# Patient Record
Sex: MALE
[De-identification: names, ages, dates, MRNs, and addresses within clinical notes are randomized per-mention and may not be internally consistent; named-entity substitution may affect disease eponyms.]

---

## 2024-08-15 ENCOUNTER — RX ONLY (OUTPATIENT)
Age: 44
Setting detail: RX ONLY
End: 2024-08-15

## 2024-08-15 ENCOUNTER — APPOINTMENT (RX ONLY)
Dept: URBAN - METROPOLITAN AREA CLINIC 41 | Facility: CLINIC | Age: 44
Setting detail: DERMATOLOGY
End: 2024-08-15

## 2024-08-15 DIAGNOSIS — L71.8 OTHER ROSACEA: ICD-10-CM | Status: INADEQUATELY CONTROLLED

## 2024-08-15 PROCEDURE — ? PRESCRIPTION MEDICATION MANAGEMENT

## 2024-08-15 PROCEDURE — ? MEDICATION COUNSELING

## 2024-08-15 PROCEDURE — ? COUNSELING

## 2024-08-15 PROCEDURE — 99213 OFFICE O/P EST LOW 20 MIN: CPT

## 2024-08-15 PROCEDURE — ? MDM - TREATMENT GOALS

## 2024-08-15 RX ORDER — DOXYCYCLINE 100 MG/1
CAPSULE ORAL
Qty: 90 | Refills: 0 | Status: ERX

## 2024-08-15 ASSESSMENT — LOCATION DETAILED DESCRIPTION DERM
LOCATION DETAILED: LEFT MEDIAL MALAR CHEEK
LOCATION DETAILED: RIGHT INFERIOR CENTRAL MALAR CHEEK
LOCATION DETAILED: LEFT CENTRAL MALAR CHEEK

## 2024-08-15 ASSESSMENT — LOCATION SIMPLE DESCRIPTION DERM
LOCATION SIMPLE: LEFT CHEEK
LOCATION SIMPLE: RIGHT CHEEK

## 2024-08-15 ASSESSMENT — LOCATION ZONE DERM: LOCATION ZONE: FACE

## 2024-08-15 NOTE — PROCEDURE: PRESCRIPTION MEDICATION MANAGEMENT
Render In Strict Bullet Format?: No
Discontinue Regimen: Doxycycline 100 mg BID x 1 month —> Doxy 100mg QD x 3 months
Detail Level: Zone

## 2024-08-15 NOTE — PROCEDURE: MEDICATION COUNSELING
Ilumya Pregnancy And Lactation Text: The risk during pregnancy and breastfeeding is uncertain with this medication.
Erythromycin Pregnancy And Lactation Text: This medication is Pregnancy Category B and is considered safe during pregnancy. It is also excreted in breast milk.
Dupixent Counseling: I discussed with the patient the risks of dupilumab including but not limited to eye infection and irritation, cold sores, injection site reactions, worsening of asthma, allergic reactions and increased risk of parasitic infection.  Live vaccines should be avoided while taking dupilumab. Dupilumab will also interact with certain medications such as warfarin and cyclosporine. The patient understands that monitoring is required and they must alert us or the primary physician if symptoms of infection or other concerning signs are noted.
Oral Minoxidil Counseling- I discussed with the patient the risks of oral minoxidil including but not limited to shortness of breath, swelling of the feet or ankles, dizziness, lightheadedness, unwanted hair growth and allergic reaction.  The patient verbalized understanding of the proper use and possible adverse effects of oral minoxidil.  All of the patient's questions and concerns were addressed.
Acitretin Pregnancy And Lactation Text: This medication is Pregnancy Category X and should not be given to women who are pregnant or may become pregnant in the future. This medication is excreted in breast milk.
Colchicine Counseling:  Patient counseled regarding adverse effects including but not limited to stomach upset (nausea, vomiting, stomach pain, or diarrhea).  Patient instructed to limit alcohol consumption while taking this medication.  Colchicine may reduce blood counts especially with prolonged use.  The patient understands that monitoring of kidney function and blood counts may be required, especially at baseline. The patient verbalized understanding of the proper use and possible adverse effects of colchicine.  All of the patient's questions and concerns were addressed.
Zoryve Counseling:  I discussed with the patient that Zoryve is not for use in the eyes, mouth or vagina. The most commonly reported side effects include diarrhea, headache, insomnia, application site pain, upper respiratory tract infections, and urinary tract infections.  All of the patient's questions and concerns were addressed.
Hydroxychloroquine Pregnancy And Lactation Text: This medication has been shown to cause fetal harm but it isn't assigned a Pregnancy Risk Category. There are small amounts excreted in breast milk.
Rifampin Counseling: I discussed with the patient the risks of rifampin including but not limited to liver damage, kidney damage, red-orange body fluids, nausea/vomiting and severe allergy.
Methotrexate Counseling:  Patient counseled regarding adverse effects of methotrexate including but not limited to nausea, vomiting, abnormalities in liver function tests. Patients may develop mouth sores, rash, diarrhea, and abnormalities in blood counts. The patient understands that monitoring is required including LFT's and blood counts.  There is a rare possibility of scarring of the liver and lung problems that can occur when taking methotrexate. Persistent nausea, loss of appetite, pale stools, dark urine, cough, and shortness of breath should be reported immediately. Patient advised to discontinue methotrexate treatment at least three months before attempting to become pregnant.  I discussed the need for folate supplements while taking methotrexate.  These supplements can decrease side effects during methotrexate treatment. The patient verbalized understanding of the proper use and possible adverse effects of methotrexate.  All of the patient's questions and concerns were addressed.
Skyrizi Counseling: I discussed with the patient the risks of risankizumab-rzaa including but not limited to immunosuppression, and serious infections.  The patient understands that monitoring is required including a PPD at baseline and must alert us or the primary physician if symptoms of infection or other concerning signs are noted.
Xolair Pregnancy And Lactation Text: This medication is Pregnancy Category B and is considered safe during pregnancy. This medication is excreted in breast milk.
Azelaic Acid Counseling: Patient counseled that medicine may cause skin irritation and to avoid applying near the eyes.  In the event of skin irritation, the patient was advised to reduce the amount of the drug applied or use it less frequently.   The patient verbalized understanding of the proper use and possible adverse effects of azelaic acid.  All of the patient's questions and concerns were addressed.
Tazorac Counseling:  Patient advised that medication is irritating and drying.  Patient may need to apply sparingly and wash off after an hour before eventually leaving it on overnight.  The patient verbalized understanding of the proper use and possible adverse effects of tazorac.  All of the patient's questions and concerns were addressed.
Topical Steroids Applications Pregnancy And Lactation Text: Most topical steroids are considered safe to use during pregnancy and lactation.  Any topical steroid applied to the breast or nipple should be washed off before breastfeeding.
Griseofulvin Pregnancy And Lactation Text: This medication is Pregnancy Category X and is known to cause serious birth defects. It is unknown if this medication is excreted in breast milk but breast feeding should be avoided.
Olumiant Pregnancy And Lactation Text: Based on animal studies, Olumiant may cause embryo-fetal harm when administered to pregnant women.  The medication should not be used in pregnancy.  Breastfeeding is not recommended during treatment.
Qbrexza Pregnancy And Lactation Text: There is no available data on Qbrexza use in pregnant women.  There is no available data on Qbrexza use in lactation.
Mirvaso Counseling: Mirvaso is a topical medication which can decrease superficial blood flow where applied. Side effects are uncommon and include stinging, redness and allergic reactions.
Dutasteride Female Counseling: Dutasteride Counseling:  I discussed with the patient the risks of use of dutasteride including but not limited to decreased libido and sexual dysfunction. Explained the teratogenic nature of the medication and stressed the importance of not getting pregnant during treatment. All of the patient's questions and concerns were addressed.
Eucrisa Pregnancy And Lactation Text: This medication has not been assigned a Pregnancy Risk Category but animal studies failed to show danger with the topical medication. It is unknown if the medication is excreted in breast milk.
Calcipotriene Pregnancy And Lactation Text: The use of this medication during pregnancy or lactation is not recommended as there is insufficient data.
Bactrim Pregnancy And Lactation Text: This medication is Pregnancy Category D and is known to cause fetal risk.  It is also excreted in breast milk.
Dupixent Pregnancy And Lactation Text: This medication likely crosses the placenta but the risk for the fetus is uncertain. This medication is excreted in breast milk.
Infliximab Counseling:  I discussed with the patient the risks of infliximab including but not limited to myelosuppression, immunosuppression, autoimmune hepatitis, demyelinating diseases, lymphoma, and serious infections.  The patient understands that monitoring is required including a PPD at baseline and must alert us or the primary physician if symptoms of infection or other concerning signs are noted.
Doxepin Counseling:  Patient advised that the medication is sedating and not to drive a car after taking this medication. Patient informed of potential adverse effects including but not limited to dry mouth, urinary retention, and blurry vision.  The patient verbalized understanding of the proper use and possible adverse effects of doxepin.  All of the patient's questions and concerns were addressed.
Oral Minoxidil Pregnancy And Lactation Text: This medication should only be used when clearly needed if you are pregnant, attempting to become pregnant or breast feeding.
Thalidomide Counseling: I discussed with the patient the risks of thalidomide including but not limited to birth defects, anxiety, weakness, chest pain, dizziness, cough and severe allergy.
Bexarotene Counseling:  I discussed with the patient the risks of bexarotene including but not limited to hair loss, dry lips/skin/eyes, liver abnormalities, hyperlipidemia, pancreatitis, depression/suicidal ideation, photosensitivity, drug rash/allergic reactions, hypothyroidism, anemia, leukopenia, infection, cataracts, and teratogenicity.  Patient understands that they will need regular blood tests to check lipid profile, liver function tests, white blood cell count, thyroid function tests and pregnancy test if applicable.
Colchicine Pregnancy And Lactation Text: This medication is Pregnancy Category C and isn't considered safe during pregnancy. It is excreted in breast milk.
Rifampin Pregnancy And Lactation Text: This medication is Pregnancy Category C and it isn't know if it is safe during pregnancy. It is also excreted in breast milk and should not be used if you are breast feeding.
Adbry Counseling: I discussed with the patient the risks of tralokinumab including but not limited to eye infection and irritation, cold sores, injection site reactions, worsening of asthma, allergic reactions and increased risk of parasitic infection.  Live vaccines should be avoided while taking tralokinumab. The patient understands that monitoring is required and they must alert us or the primary physician if symptoms of infection or other concerning signs are noted.
Azathioprine Counseling:  I discussed with the patient the risks of azathioprine including but not limited to myelosuppression, immunosuppression, hepatotoxicity, lymphoma, and infections.  The patient understands that monitoring is required including baseline LFTs, Creatinine, possible TPMP genotyping and weekly CBCs for the first month and then every 2 weeks thereafter.  The patient verbalized understanding of the proper use and possible adverse effects of azathioprine.  All of the patient's questions and concerns were addressed.
Methotrexate Pregnancy And Lactation Text: This medication is Pregnancy Category X and is known to cause fetal harm. This medication is excreted in breast milk.
Low Dose Naltrexone Counseling- I discussed with the patient the potential risks and side effects of low dose naltrexone including but not limited to: more vivid dreams, headaches, nausea, vomiting, abdominal pain, fatigue, dizziness, and anxiety.
Zoryve Pregnancy And Lactation Text: It is unknown if this medication can cause problems during pregnancy and breastfeeding.
Topical Sulfur Applications Counseling: Topical Sulfur Counseling: Patient counseled that this medication may cause skin irritation or allergic reactions.  In the event of skin irritation, the patient was advised to reduce the amount of the drug applied or use it less frequently.   The patient verbalized understanding of the proper use and possible adverse effects of topical sulfur application.  All of the patient's questions and concerns were addressed.
Itraconazole Counseling:  I discussed with the patient the risks of itraconazole including but not limited to liver damage, nausea/vomiting, neuropathy, and severe allergy.  The patient understands that this medication is best absorbed when taken with acidic beverages such as non-diet cola or ginger ale.  The patient understands that monitoring is required including baseline LFTs and repeat LFTs at intervals.  The patient understands that they are to contact us or the primary physician if concerning signs are noted.
Rhofade Counseling: Rhofade is a topical medication which can decrease superficial blood flow where applied. Side effects are uncommon and include stinging, redness and allergic reactions.
Tazorac Pregnancy And Lactation Text: This medication is not safe during pregnancy. It is unknown if this medication is excreted in breast milk.
Dutasteride Pregnancy And Lactation Text: This medication is absolutely contraindicated in women, especially during pregnancy and breast feeding. Feminization of male fetuses is possible if taking while pregnant.
Mirvaso Pregnancy And Lactation Text: This medication has not been assigned a Pregnancy Risk Category. It is unknown if the medication is excreted in breast milk.
Azelaic Acid Pregnancy And Lactation Text: This medication is considered safe during pregnancy and breast feeding.
Hydroquinone Counseling:  Patient advised that medication may result in skin irritation, lightening (hypopigmentation), dryness, and burning.  In the event of skin irritation, the patient was advised to reduce the amount of the drug applied or use it less frequently.  Rarely, spots that are treated with hydroquinone can become darker (pseudoochronosis).  Should this occur, patient instructed to stop medication and call the office. The patient verbalized understanding of the proper use and possible adverse effects of hydroquinone.  All of the patient's questions and concerns were addressed.
Rinvoq Counseling: I discussed with the patient the risks of Rinvoq therapy including but not limited to upper respiratory tract infections, shingles, cold sores, bronchitis, nausea, cough, fever, acne, and headache. Live vaccines should be avoided.  This medication has been linked to serious infections; higher rate of mortality; malignancy and lymphoproliferative disorders; major adverse cardiovascular events; thrombosis; thrombocytopenia, anemia, and neutropenia; lipid elevations; liver enzyme elevations; and gastrointestinal perforations.
Enbrel Counseling:  I discussed with the patient the risks of etanercept including but not limited to myelosuppression, immunosuppression, autoimmune hepatitis, demyelinating diseases, lymphoma, and infections.  The patient understands that monitoring is required including a PPD at baseline and must alert us or the primary physician if symptoms of infection or other concerning signs are noted.
Cephalexin Counseling: I counseled the patient regarding use of cephalexin as an antibiotic for prophylactic and/or therapeutic purposes. Cephalexin (commonly prescribed under brand name Keflex) is a cephalosporin antibiotic which is active against numerous classes of bacteria, including most skin bacteria. Side effects may include nausea, diarrhea, gastrointestinal upset, rash, hives, yeast infections, and in rare cases, hepatitis, kidney disease, seizures, fever, confusion, neurologic symptoms, and others. Patients with severe allergies to penicillin medications are cautioned that there is about a 10% incidence of cross-reactivity with cephalosporins. When possible, patients with penicillin allergies should use alternatives to cephalosporins for antibiotic therapy.
Cantharidin Counseling:  I discussed with the patient the risks of Cantharidin including but not limited to pain, redness, burning, itching, and blistering.
Adbry Pregnancy And Lactation Text: It is unknown if this medication will adversely affect pregnancy or breast feeding.
Thalidomide Pregnancy And Lactation Text: This medication is Pregnancy Category X and is absolutely contraindicated during pregnancy. It is unknown if it is excreted in breast milk.
Erivedge Counseling- I discussed with the patient the risks of Erivedge including but not limited to nausea, vomiting, diarrhea, constipation, weight loss, changes in the sense of taste, decreased appetite, muscle spasms, and hair loss.  The patient verbalized understanding of the proper use and possible adverse effects of Erivedge.  All of the patient's questions and concerns were addressed.
Bexarotene Pregnancy And Lactation Text: This medication is Pregnancy Category X and should not be given to women who are pregnant or may become pregnant. This medication should not be used if you are breast feeding.
Doxepin Pregnancy And Lactation Text: This medication is Pregnancy Category C and it isn't known if it is safe during pregnancy. It is also excreted in breast milk and breast feeding isn't recommended.
Sarecycline Counseling: Patient advised regarding possible photosensitivity and discoloration of the teeth, skin, lips, tongue and gums.  Patient instructed to avoid sunlight, if possible.  When exposed to sunlight, patients should wear protective clothing, sunglasses, and sunscreen.  The patient was instructed to call the office immediately if the following severe adverse effects occur:  hearing changes, easy bruising/bleeding, severe headache, or vision changes.  The patient verbalized understanding of the proper use and possible adverse effects of sarecycline.  All of the patient's questions and concerns were addressed.
Low Dose Naltrexone Pregnancy And Lactation Text: Naltrexone is pregnancy category C.  There have been no adequate and well-controlled studies in pregnant women.  It should be used in pregnancy only if the potential benefit justifies the potential risk to the fetus.   Limited data indicates that naltrexone is minimally excreted into breastmilk.
Otezla Counseling: The side effects of Otezla were discussed with the patient, including but not limited to worsening or new depression, weight loss, diarrhea, nausea, upper respiratory tract infection, and headache. Patient instructed to call the office should any adverse effect occur.  The patient verbalized understanding of the proper use and possible adverse effects of Otezla.  All the patient's questions and concerns were addressed.
Prednisone Counseling:  I discussed with the patient the risks of prolonged use of prednisone including but not limited to weight gain, insomnia, osteoporosis, mood changes, diabetes, susceptibility to infection, glaucoma and high blood pressure.  In cases where prednisone use is prolonged, patients should be monitored with blood pressure checks, serum glucose levels and an eye exam.  Additionally, the patient may need to be placed on GI prophylaxis, PCP prophylaxis, and calcium and vitamin D supplementation and/or a bisphosphonate.  The patient verbalized understanding of the proper use and the possible adverse effects of prednisone.  All of the patient's questions and concerns were addressed.
Infliximab Pregnancy And Lactation Text: This medication is Pregnancy Category B and is considered safe during pregnancy. It is unknown if this medication is excreted in breast milk.
Detail Level: Simple
Dapsone Counseling: I discussed with the patient the risks of dapsone including but not limited to hemolytic anemia, agranulocytosis, rashes, methemoglobinemia, kidney failure, peripheral neuropathy, headaches, GI upset, and liver toxicity.  Patients who start dapsone require monitoring including baseline LFTs and weekly CBCs for the first month, then every month thereafter.  The patient verbalized understanding of the proper use and possible adverse effects of dapsone.  All of the patient's questions and concerns were addressed.
Azathioprine Pregnancy And Lactation Text: This medication is Pregnancy Category D and isn't considered safe during pregnancy. It is unknown if this medication is excreted in breast milk.
Zyclara Counseling:  I discussed with the patient the risks of imiquimod including but not limited to erythema, scaling, itching, weeping, crusting, and pain.  Patient understands that the inflammatory response to imiquimod is variable from person to person and was educated regarded proper titration schedule.  If flu-like symptoms develop, patient knows to discontinue the medication and contact us.
Cantharidin Pregnancy And Lactation Text: This medication has not been proven safe during pregnancy. It is unknown if this medication is excreted in breast milk.
Topical Sulfur Applications Pregnancy And Lactation Text: This medication is considered safe during pregnancy and breast feeding secondary to limited systemic absorption.
Spevigo Counseling: I discussed with the patient the risks of Spevigo including but not limited to fatigue, nasuea, vomiting, headache, pruritus, urinary tract infection, an infusion related reactions.  The patient understands that monitoring is required including screening for tuberculosis at baseline and yearly screening thereafter while continuing Spevigo therapy. They should contact us if symptoms of infection or other concerning signs are noted.
Benzoyl Peroxide Counseling: Patient counseled that medicine may cause skin irritation and bleach clothing.  In the event of skin irritation, the patient was advised to reduce the amount of the drug applied or use it less frequently.   The patient verbalized understanding of the proper use and possible adverse effects of benzoyl peroxide.  All of the patient's questions and concerns were addressed.
Tremfya Counseling: I discussed with the patient the risks of guselkumab including but not limited to immunosuppression, serious infections, and drug reactions.  The patient understands that monitoring is required including a PPD at baseline and must alert us or the primary physician if symptoms of infection or other concerning signs are noted.
Topical Clindamycin Counseling: Patient counseled that this medication may cause skin irritation or allergic reactions.  In the event of skin irritation, the patient was advised to reduce the amount of the drug applied or use it less frequently.   The patient verbalized understanding of the proper use and possible adverse effects of clindamycin.  All of the patient's questions and concerns were addressed.
Rinvoq Pregnancy And Lactation Text: Based on animal studies, Rinvoq may cause embryo-fetal harm when administered to pregnant women.  The medication should not be used in pregnancy.  Breastfeeding is not recommended during treatment and for 6 days after the last dose.
Itraconazole Pregnancy And Lactation Text: This medication is Pregnancy Category C and it isn't know if it is safe during pregnancy. It is also excreted in breast milk.
Finasteride Male Counseling: Finasteride Counseling:  I discussed with the patient the risks of use of finasteride including but not limited to decreased libido, decreased ejaculate volume, gynecomastia, and depression. Women should not handle medication.  All of the patient's questions and concerns were addressed.
Opzelura Counseling:  I discussed with the patient the risks of Opzelura including but not limited to nasopharngitis, bronchitis, ear infection, eosinophila, hives, diarrhea, folliculitis, tonsillitis, and rhinorrhea.  Taken orally, this medication has been linked to serious infections; higher rate of mortality; malignancy and lymphoproliferative disorders; major adverse cardiovascular events; thrombosis; thrombocytopenia, anemia, and neutropenia; and lipid elevations.
5-Fu Counseling: 5-Fluorouracil Counseling:  I discussed with the patient the risks of 5-fluorouracil including but not limited to erythema, scaling, itching, weeping, crusting, and pain.
Tranexamic Acid Counseling:  Patient advised of the small risk of bleeding problems with tranexamic acid. They were also instructed to call if they developed any nausea, vomiting or diarrhea. All of the patient's questions and concerns were addressed.
Cephalexin Pregnancy And Lactation Text: This medication is Pregnancy Category B and considered safe during pregnancy.  It is also excreted in breast milk but can be used safely for shorter doses.
Hydroxyzine Counseling: Patient advised that the medication is sedating and not to drive a car after taking this medication.  Patient informed of potential adverse effects including but not limited to dry mouth, urinary retention, and blurry vision.  The patient verbalized understanding of the proper use and possible adverse effects of hydroxyzine.  All of the patient's questions and concerns were addressed.
Bimzelx Counseling:  I discussed with the patient the risks of Bimzelx including but not limited to depression, immunosuppression, allergic reactions and infections.  The patient understands that monitoring is required including a PPD at baseline and must alert us or the primary physician if symptoms of infection or other concerning signs are noted.
Prednisone Pregnancy And Lactation Text: This medication is Pregnancy Category C and it isn't know if it is safe during pregnancy. This medication is excreted in breast milk.
Sarecycline Pregnancy And Lactation Text: This medication is Pregnancy Category D and not consider safe during pregnancy. It is also excreted in breast milk.
Otezla Pregnancy And Lactation Text: This medication is Pregnancy Category C and it isn't known if it is safe during pregnancy. It is unknown if it is excreted in breast milk.
Isotretinoin Counseling: Patient should get monthly blood tests, not donate blood, not drive at night if vision affected, not share medication, and not undergo elective surgery for 6 months after tx completed. Side effects reviewed, pt to contact office should one occur.
Rituxan Counseling:  I discussed with the patient the risks of Rituxan infusions. Side effects can include infusion reactions, severe drug rashes including mucocutaneous reactions, reactivation of latent hepatitis and other infections and rarely progressive multifocal leukoencephalopathy.  All of the patient's questions and concerns were addressed.
Metronidazole Counseling:  I discussed with the patient the risks of metronidazole including but not limited to seizures, nausea/vomiting, a metallic taste in the mouth, nausea/vomiting and severe allergy.
Dapsone Pregnancy And Lactation Text: This medication is Pregnancy Category C and is not considered safe during pregnancy or breast feeding.
Cellcept Counseling:  I discussed with the patient the risks of mycophenolate mofetil including but not limited to infection/immunosuppression, GI upset, hypokalemia, hypercholesterolemia, bone marrow suppression, lymphoproliferative disorders, malignancy, GI ulceration/bleed/perforation, colitis, interstitial lung disease, kidney failure, progressive multifocal leukoencephalopathy, and birth defects.  The patient understands that monitoring is required including a baseline creatinine and regular CBC testing. In addition, patient must alert us immediately if symptoms of infection or other concerning signs are noted.
Wartpeel Counseling:  I discussed with the patient the risks of Wartpeel including but not limited to erythema, scaling, itching, weeping, crusting, and pain.
Niacinamide Counseling: I recommended taking niacin or niacinamide, also know as vitamin B3, twice daily. Recent evidence suggests that taking vitamin B3 (500 mg twice daily) can reduce the risk of actinic keratoses and non-melanoma skin cancers. Side effects of vitamin B3 include flushing and headache.
Include Pregnancy/Lactation Warning?: No
Solaraze Counseling:  I discussed with the patient the risks of Solaraze including but not limited to erythema, scaling, itching, weeping, crusting, and pain.
Zyclara Pregnancy And Lactation Text: This medication is Pregnancy Category C. It is unknown if this medication is excreted in breast milk.
Topical Clindamycin Pregnancy And Lactation Text: This medication is Pregnancy Category B and is considered safe during pregnancy. It is unknown if it is excreted in breast milk.
Spevigo Pregnancy And Lactation Text: The risk during pregnancy and breastfeeding is uncertain with this medication. This medication does cross the placenta. It is unknown if this medication is found in breast milk.
Ketoconazole Counseling:   Patient counseled regarding improving absorption with orange juice.  Adverse effects include but are not limited to breast enlargement, headache, diarrhea, nausea, upset stomach, liver function test abnormalities, taste disturbance, and stomach pain.  There is a rare possibility of liver failure that can occur when taking ketoconazole. The patient understands that monitoring of LFTs may be required, especially at baseline. The patient verbalized understanding of the proper use and possible adverse effects of ketoconazole.  All of the patient's questions and concerns were addressed.
Sotyktu Counseling:  I discussed the most common side effects of Sotyktu including: common cold, sore throat, sinus infections, cold sores, canker sores, folliculitis, and acne.? I also discussed more serious side effects of Sotyktu including but not limited to: serious allergic reactions; increased risk for infections such as TB; cancers such as lymphomas; rhabdomyolysis and elevated CPK; and elevated triglycerides and liver enzymes.?
Arava Counseling:  Patient counseled regarding adverse effects of Arava including but not limited to nausea, vomiting, abnormalities in liver function tests. Patients may develop mouth sores, rash, diarrhea, and abnormalities in blood counts. The patient understands that monitoring is required including LFTs and blood counts.  There is a rare possibility of scarring of the liver and lung problems that can occur when taking methotrexate. Persistent nausea, loss of appetite, pale stools, dark urine, cough, and shortness of breath should be reported immediately. Patient advised to discontinue Arava treatment and consult with a physician prior to attempting conception. The patient will have to undergo a treatment to eliminate Arava from the body prior to conception.
Finasteride Female Counseling: Finasteride Counseling:  I discussed with the patient the risks of use of finasteride including but not limited to decreased libido and sexual dysfunction. Explained the teratogenic nature of the medication and stressed the importance of not getting pregnant during treatment. All of the patient's questions and concerns were addressed.
Opzelura Pregnancy And Lactation Text: There is insufficient data to evaluate drug-associated risk for major birth defects, miscarriage, or other adverse maternal or fetal outcomes.  There is a pregnancy registry that monitors pregnancy outcomes in pregnant persons exposed to the medication during pregnancy.  It is unknown if this medication is excreted in breast milk.  Do not breastfeed during treatment and for about 4 weeks after the last dose.
5-Fu Pregnancy And Lactation Text: This medication is Pregnancy Category X and contraindicated in pregnancy and in women who may become pregnant. It is unknown if this medication is excreted in breast milk.
Imiquimod Counseling:  I discussed with the patient the risks of imiquimod including but not limited to erythema, scaling, itching, weeping, crusting, and pain.  Patient understands that the inflammatory response to imiquimod is variable from person to person and was educated regarded proper titration schedule.  If flu-like symptoms develop, patient knows to discontinue the medication and contact us.
Tranexamic Acid Pregnancy And Lactation Text: It is unknown if this medication is safe during pregnancy or breast feeding.
Clindamycin Counseling: I counseled the patient regarding use of clindamycin as an antibiotic for prophylactic and/or therapeutic purposes. Clindamycin is active against numerous classes of bacteria, including skin bacteria. Side effects may include nausea, diarrhea, gastrointestinal upset, rash, hives, yeast infections, and in rare cases, colitis.
Bimzelx Pregnancy And Lactation Text: This medication crosses the placenta and the safety is uncertain during pregnancy. It is unknown if this medication is present in breast milk.
Humira Counseling:  I discussed with the patient the risks of adalimumab including but not limited to myelosuppression, immunosuppression, autoimmune hepatitis, demyelinating diseases, lymphoma, and serious infections.  The patient understands that monitoring is required including a PPD at baseline and must alert us or the primary physician if symptoms of infection or other concerning signs are noted.
Oxybutynin Counseling:  I discussed with the patient the risks of oxybutynin including but not limited to skin rash, drowsiness, dry mouth, difficulty urinating, and blurred vision.
Isotretinoin Pregnancy And Lactation Text: This medication is Pregnancy Category X and is considered extremely dangerous during pregnancy. It is unknown if it is excreted in breast milk.
Libtayo Counseling- I discussed with the patient the risks of Libtayo including but not limited to nausea, vomiting, diarrhea, and bone or muscle pain.  The patient verbalized understanding of the proper use and possible adverse effects of Libtayo.  All of the patient's questions and concerns were addressed.
Metronidazole Pregnancy And Lactation Text: This medication is Pregnancy Category B and considered safe during pregnancy.  It is also excreted in breast milk.
Hydroxyzine Pregnancy And Lactation Text: This medication is not safe during pregnancy and should not be taken. It is also excreted in breast milk and breast feeding isn't recommended.
Gabapentin Counseling: I discussed with the patient the risks of gabapentin including but not limited to dizziness, somnolence, fatigue and ataxia.
Niacinamide Pregnancy And Lactation Text: These medications are considered safe during pregnancy.
Tetracycline Counseling: Patient counseled regarding possible photosensitivity and increased risk for sunburn.  Patient instructed to avoid sunlight, if possible.  When exposed to sunlight, patients should wear protective clothing, sunglasses, and sunscreen.  The patient was instructed to call the office immediately if the following severe adverse effects occur:  hearing changes, easy bruising/bleeding, severe headache, or vision changes.  The patient verbalized understanding of the proper use and possible adverse effects of tetracycline.  All of the patient's questions and concerns were addressed. Patient understands to avoid pregnancy while on therapy due to potential birth defects.
Rituxan Pregnancy And Lactation Text: This medication is Pregnancy Category C and it isn't know if it is safe during pregnancy. It is unknown if this medication is excreted in breast milk but similar antibodies are known to be excreted.
Topical Ketoconazole Counseling: Patient counseled that this medication may cause skin irritation or allergic reactions.  In the event of skin irritation, the patient was advised to reduce the amount of the drug applied or use it less frequently.   The patient verbalized understanding of the proper use and possible adverse effects of ketoconazole.  All of the patient's questions and concerns were addressed.
Picato Counseling:  I discussed with the patient the risks of Picato including but not limited to erythema, scaling, itching, weeping, crusting, and pain.
Ketoconazole Pregnancy And Lactation Text: This medication is Pregnancy Category C and it isn't know if it is safe during pregnancy. It is also excreted in breast milk and breast feeding isn't recommended.
Sotyktu Pregnancy And Lactation Text: There is insufficient data to evaluate whether or not Sotyktu is safe to use during pregnancy.? ?It is not known if Sotyktu passes into breast milk and whether or not it is safe to use when breastfeeding.??
Solaraze Pregnancy And Lactation Text: This medication is Pregnancy Category B and is considered safe. There is some data to suggest avoiding during the third trimester. It is unknown if this medication is excreted in breast milk.
Finasteride Pregnancy And Lactation Text: This medication is absolutely contraindicated during pregnancy. It is unknown if it is excreted in breast milk.
Taltz Counseling: I discussed with the patient the risks of ixekizumab including but not limited to immunosuppression, serious infections, worsening of inflammatory bowel disease and drug reactions.  The patient understands that monitoring is required including a PPD at baseline and must alert us or the primary physician if symptoms of infection or other concerning signs are noted.
Cibinqo Counseling: I discussed with the patient the risks of Cibinqo therapy including but not limited to common cold, nausea, headache, cold sores, increased blood CPK levels, dizziness, UTIs, fatigue, acne, and vomitting. Live vaccines should be avoided.  This medication has been linked to serious infections; higher rate of mortality; malignancy and lymphoproliferative disorders; major adverse cardiovascular events; thrombosis; thrombocytopenia and lymphopenia; lipid elevations; and retinal detachment.
Drysol Counseling:  I discussed with the patient the risks of drysol/aluminum chloride including but not limited to skin rash, itching, irritation, burning.
Clindamycin Pregnancy And Lactation Text: This medication can be used in pregnancy if certain situations. Clindamycin is also present in breast milk.
Opioid Counseling: I discussed with the patient the potential side effects of opioids including but not limited to addiction, altered mental status, and depression. I stressed avoiding alcohol, benzodiazepines, muscle relaxants and sleep aids unless specifically okayed by a physician. The patient verbalized understanding of the proper use and possible adverse effects of opioids. All of the patient's questions and concerns were addressed. They were instructed to flush the remaining pills down the toilet if they did not need them for pain.
Valtrex Counseling: I discussed with the patient the risks of valacyclovir including but not limited to kidney damage, nausea, vomiting and severe allergy.  The patient understands that if the infection seems to be worsening or is not improving, they are to call.
Libtayo Pregnancy And Lactation Text: This medication is contraindicated in pregnancy and when breast feeding.
High Dose Vitamin A Counseling: Side effects reviewed, pt to contact office should one occur.
Siliq Counseling:  I discussed with the patient the risks of Siliq including but not limited to new or worsening depression, suicidal thoughts and behavior, immunosuppression, malignancy, posterior leukoencephalopathy syndrome, and serious infections.  The patient understands that monitoring is required including a PPD at baseline and must alert us or the primary physician if symptoms of infection or other concerning signs are noted. There is also a special program designed to monitor depression which is required with Siliq.
Cyclophosphamide Counseling:  I discussed with the patient the risks of cyclophosphamide including but not limited to hair loss, hormonal abnormalities, decreased fertility, abdominal pain, diarrhea, nausea and vomiting, bone marrow suppression and infection. The patient understands that monitoring is required while taking this medication.
Cimzia Counseling:  I discussed with the patient the risks of Cimzia including but not limited to immunosuppression, allergic reactions and infections.  The patient understands that monitoring is required including a PPD at baseline and must alert us or the primary physician if symptoms of infection or other concerning signs are noted.
Nsaids Counseling: NSAID Counseling: I discussed with the patient that NSAIDs should be taken with food. Prolonged use of NSAIDs can result in the development of stomach ulcers.  Patient advised to stop taking NSAIDs if abdominal pain occurs.  The patient verbalized understanding of the proper use and possible adverse effects of NSAIDs.  All of the patient's questions and concerns were addressed.
Minocycline Counseling: Patient advised regarding possible photosensitivity and discoloration of the teeth, skin, lips, tongue and gums.  Patient instructed to avoid sunlight, if possible.  When exposed to sunlight, patients should wear protective clothing, sunglasses, and sunscreen.  The patient was instructed to call the office immediately if the following severe adverse effects occur:  hearing changes, easy bruising/bleeding, severe headache, or vision changes.  The patient verbalized understanding of the proper use and possible adverse effects of minocycline.  All of the patient's questions and concerns were addressed.
Opioid Pregnancy And Lactation Text: These medications can lead to premature delivery and should be avoided during pregnancy. These medications are also present in breast milk in small amounts.
Winlevi Counseling:  I discussed with the patient the risks of topical clascoterone including but not limited to erythema, scaling, itching, and stinging. Patient voiced their understanding.
Terbinafine Counseling: Patient counseling regarding adverse effects of terbinafine including but not limited to headache, diarrhea, rash, upset stomach, liver function test abnormalities, itching, taste/smell disturbance, nausea, abdominal pain, and flatulence.  There is a rare possibility of liver failure that can occur when taking terbinafine.  The patient understands that a baseline LFT and kidney function test may be required. The patient verbalized understanding of the proper use and possible adverse effects of terbinafine.  All of the patient's questions and concerns were addressed.
Soolantra Counseling: I discussed with the patients the risks of topial Soolantra. This is a medicine which decreases the number of mites and inflammation in the skin. You experience burning, stinging, eye irritation or allergic reactions.  Please call our office if you develop any problems from using this medication.
Albendazole Counseling:  I discussed with the patient the risks of albendazole including but not limited to cytopenia, kidney damage, nausea/vomiting and severe allergy.  The patient understands that this medication is being used in an off-label manner.
Cibinqo Pregnancy And Lactation Text: It is unknown if this medication will adversely affect pregnancy or breast feeding.  You should not take this medication if you are currently pregnant or planning a pregnancy or while breastfeeding.
Xeljanz Counseling: I discussed with the patient the risks of Xeljanz therapy including increased risk of infection, liver issues, headache, diarrhea, or cold symptoms. Live vaccines should be avoided. They were instructed to call if they have any problems.
Klisyri Counseling:  I discussed with the patient the risks of Klisyri including but not limited to erythema, scaling, itching, weeping, crusting, and pain.
Benzoyl Peroxide Pregnancy And Lactation Text: This medication is Pregnancy Category C. It is unknown if benzoyl peroxide is excreted in breast milk.
Birth Control Pills Counseling: Birth Control Pill Counseling: I discussed with the patient the potential side effects of OCPs including but not limited to increased risk of stroke, heart attack, thrombophlebitis, deep venous thrombosis, hepatic adenomas, breast changes, GI upset, headaches, and depression.  The patient verbalized understanding of the proper use and possible adverse effects of OCPs. All of the patient's questions and concerns were addressed.
Cimzia Pregnancy And Lactation Text: This medication crosses the placenta but can be considered safe in certain situations. Cimzia may be excreted in breast milk.
Valtrex Pregnancy And Lactation Text: this medication is Pregnancy Category B and is considered safe during pregnancy. This medication is not directly found in breast milk but it's metabolite acyclovir is present.
Doxycycline Counseling:  Patient counseled regarding possible photosensitivity and increased risk for sunburn.  Patient instructed to avoid sunlight, if possible.  When exposed to sunlight, patients should wear protective clothing, sunglasses, and sunscreen.  The patient was instructed to call the office immediately if the following severe adverse effects occur:  hearing changes, easy bruising/bleeding, severe headache, or vision changes.  The patient verbalized understanding of the proper use and possible adverse effects of doxycycline.  All of the patient's questions and concerns were addressed.
High Dose Vitamin A Pregnancy And Lactation Text: High dose vitamin A therapy is contraindicated during pregnancy and breast feeding.
Nsaids Pregnancy And Lactation Text: These medications are considered safe up to 30 weeks gestation. It is excreted in breast milk.
Hyrimoz Counseling:  I discussed with the patient the risks of adalimumab including but not limited to myelosuppression, immunosuppression, autoimmune hepatitis, demyelinating diseases, lymphoma, and serious infections.  The patient understands that monitoring is required including a PPD at baseline and must alert us or the primary physician if symptoms of infection or other concerning signs are noted.
Propranolol Counseling:  I discussed with the patient the risks of propranolol including but not limited to low heart rate, low blood pressure, low blood sugar, restlessness and increased cold sensitivity. They should call the office if they experience any of these side effects.
Odomzo Counseling- I discussed with the patient the risks of Odomzo including but not limited to nausea, vomiting, diarrhea, constipation, weight loss, changes in the sense of taste, decreased appetite, muscle spasms, and hair loss.  The patient verbalized understanding of the proper use and possible adverse effects of Odomzo.  All of the patient's questions and concerns were addressed.
Glycopyrrolate Counseling:  I discussed with the patient the risks of glycopyrrolate including but not limited to skin rash, drowsiness, dry mouth, difficulty urinating, and blurred vision.
Cyclophosphamide Pregnancy And Lactation Text: This medication is Pregnancy Category D and it isn't considered safe during pregnancy. This medication is excreted in breast milk.
Albendazole Pregnancy And Lactation Text: This medication is Pregnancy Category C and it isn't known if it is safe during pregnancy. It is also excreted in breast milk.
Winlevi Pregnancy And Lactation Text: This medication is considered safe during pregnancy and breastfeeding.
Topical Metronidazole Counseling: Metronidazole is a topical antibiotic medication. You may experience burning, stinging, redness, or allergic reactions.  Please call our office if you develop any problems from using this medication.
Fluconazole Counseling:  Patient counseled regarding adverse effects of fluconazole including but not limited to headache, diarrhea, nausea, upset stomach, liver function test abnormalities, taste disturbance, and stomach pain.  There is a rare possibility of liver failure that can occur when taking fluconazole.  The patient understands that monitoring of LFTs and kidney function test may be required, especially at baseline. The patient verbalized understanding of the proper use and possible adverse effects of fluconazole.  All of the patient's questions and concerns were addressed.
Soolantra Pregnancy And Lactation Text: This medication is Pregnancy Category C. This medication is considered safe during breast feeding.
Litfulo Counseling: I discussed with the patient the risks of Litfulo therapy including but not limited to upper respiratory tract infections, shingles, cold sores, and nausea. Live vaccines should be avoided.  This medication has been linked to serious infections; higher rate of mortality; malignancy and lymphoproliferative disorders; major adverse cardiovascular events; thrombosis; gastrointestinal perforations; neutropenia; lymphopenia; anemia; liver enzyme elevations; and lipid elevations.
Xelcarolinz Pregnancy And Lactation Text: This medication is Pregnancy Category D and is not considered safe during pregnancy.  The risk during breast feeding is also uncertain.
Klisyri Pregnancy And Lactation Text: It is unknown if this medication can harm a developing fetus or if it is excreted in breast milk.
Terbinafine Pregnancy And Lactation Text: This medication is Pregnancy Category B and is considered safe during pregnancy. It is also excreted in breast milk and breast feeding isn't recommended.
Stelara Counseling:  I discussed with the patient the risks of ustekinumab including but not limited to immunosuppression, malignancy, posterior leukoencephalopathy syndrome, and serious infections.  The patient understands that monitoring is required including a PPD at baseline and must alert us or the primary physician if symptoms of infection or other concerning signs are noted.
Birth Control Pills Pregnancy And Lactation Text: This medication should be avoided if pregnant and for the first 30 days post-partum.
Protopic Counseling: Patient may experience a mild burning sensation during topical application. Protopic is not approved in children less than 2 years of age. There have been case reports of hematologic and skin malignancies in patients using topical calcineurin inhibitors although causality is questionable.
Elidel Counseling: Patient may experience a mild burning sensation during topical application. Elidel is not approved in children less than 2 years of age. There have been case reports of hematologic and skin malignancies in patients using topical calcineurin inhibitors although causality is questionable.
Carac Counseling:  I discussed with the patient the risks of Carac including but not limited to erythema, scaling, itching, weeping, crusting, and pain.
Azithromycin Counseling:  I discussed with the patient the risks of azithromycin including but not limited to GI upset, allergic reaction, drug rash, diarrhea, and yeast infections.
Doxycycline Pregnancy And Lactation Text: This medication is Pregnancy Category D and not consider safe during pregnancy. It is also excreted in breast milk but is considered safe for shorter treatment courses.
Cosentyx Counseling:  I discussed with the patient the risks of Cosentyx including but not limited to worsening of Crohn's disease, immunosuppression, allergic reactions and infections.  The patient understands that monitoring is required including a PPD at baseline and must alert us or the primary physician if symptoms of infection or other concerning signs are noted.
Propranolol Pregnancy And Lactation Text: This medication is Pregnancy Category C and it isn't known if it is safe during pregnancy. It is excreted in breast milk.
Quinolones Counseling:  I discussed with the patient the risks of fluoroquinolones including but not limited to GI upset, allergic reaction, drug rash, diarrhea, dizziness, photosensitivity, yeast infections, liver function test abnormalities, tendonitis/tendon rupture.
Simponi Counseling:  I discussed with the patient the risks of golimumab including but not limited to myelosuppression, immunosuppression, autoimmune hepatitis, demyelinating diseases, lymphoma, and serious infections.  The patient understands that monitoring is required including a PPD at baseline and must alert us or the primary physician if symptoms of infection or other concerning signs are noted.
Glycopyrrolate Pregnancy And Lactation Text: This medication is Pregnancy Category B and is considered safe during pregnancy. It is unknown if it is excreted breast milk.
Cyclosporine Counseling:  I discussed with the patient the risks of cyclosporine including but not limited to hypertension, gingival hyperplasia,myelosuppression, immunosuppression, liver damage, kidney damage, neurotoxicity, lymphoma, and serious infections. The patient understands that monitoring is required including baseline blood pressure, CBC, CMP, lipid panel and uric acid, and then 1-2 times monthly CMP and blood pressure.
Olanzapine Counseling- I discussed with the patient the common side effects of olanzapine including but are not limited to: lack of energy, dry mouth, increased appetite, sleepiness, tremor, constipation, dizziness, changes in behavior, or restlessness.  Explained that teenagers are more likely to experience headaches, abdominal pain, pain in the arms or legs, tiredness, and sleepiness.  Serious side effects include but are not limited: increased risk of death in elderly patients who are confused, have memory loss, or dementia-related psychosis; hyperglycemia; increased cholesterol and triglycerides; and weight gain.
Clofazimine Counseling:  I discussed with the patient the risks of clofazimine including but not limited to skin and eye pigmentation, liver damage, nausea/vomiting, gastrointestinal bleeding and allergy.
Topical Metronidazole Pregnancy And Lactation Text: This medication is Pregnancy Category B and considered safe during pregnancy.  It is also considered safe to use while breastfeeding.
VTAMA Counseling: I discussed with the patient that VTAMA is not for use in the eyes, mouth or mouth. They should call the office if they develop any signs of allergic reactions to VTAMA. The patient verbalized understanding of the proper use and possible adverse effects of VTAMA.  All of the patient's questions and concerns were addressed.
Aklief counseling:  Patient advised to apply a pea-sized amount only at bedtime and wait 30 minutes after washing their face before applying.  If too drying, patient may add a non-comedogenic moisturizer.  The most commonly reported side effects including irritation, redness, scaling, dryness, stinging, burning, itching, and increased risk of sunburn.  The patient verbalized understanding of the proper use and possible adverse effects of retinoids.  All of the patient's questions and concerns were addressed.
Ivermectin Counseling:  Patient instructed to take medication on an empty stomach with a full glass of water.  Patient informed of potential adverse effects including but not limited to nausea, diarrhea, dizziness, itching, and swelling of the extremities or lymph nodes.  The patient verbalized understanding of the proper use and possible adverse effects of ivermectin.  All of the patient's questions and concerns were addressed.
Protopic Pregnancy And Lactation Text: This medication is Pregnancy Category C. It is unknown if this medication is excreted in breast milk when applied topically.
Topical Retinoid counseling:  Patient advised to apply a pea-sized amount only at bedtime and wait 30 minutes after washing their face before applying.  If too drying, patient may add a non-comedogenic moisturizer. The patient verbalized understanding of the proper use and possible adverse effects of retinoids.  All of the patient's questions and concerns were addressed.
Litfulo Pregnancy And Lactation Text: Based on animal studies, Lifulo may cause embryo-fetal harm when administered to pregnant women.  The medication should not be used in pregnancy.  Breastfeeding is not recommended during treatment.
Minoxidil Counseling: Minoxidil is a topical medication which can increase blood flow where it is applied. It is uncertain how this medication increases hair growth. Side effects are uncommon and include stinging and allergic reactions.
Erythromycin Counseling:  I discussed with the patient the risks of erythromycin including but not limited to GI upset, allergic reaction, drug rash, diarrhea, increase in liver enzymes, and yeast infections.
Spironolactone Counseling: Patient advised regarding risks of diarrhea, abdominal pain, hyperkalemia, birth defects (for female patients), liver toxicity and renal toxicity. The patient may need blood work to monitor liver and kidney function and potassium levels while on therapy. The patient verbalized understanding of the proper use and possible adverse effects of spironolactone.  All of the patient's questions and concerns were addressed.
Ilumya Counseling: I discussed with the patient the risks of tildrakizumab including but not limited to immunosuppression, malignancy, posterior leukoencephalopathy syndrome, and serious infections.  The patient understands that monitoring is required including a PPD at baseline and must alert us or the primary physician if symptoms of infection or other concerning signs are noted.
SSKI Counseling:  I discussed with the patient the risks of SSKI including but not limited to thyroid abnormalities, metallic taste, GI upset, fever, headache, acne, arthralgias, paraesthesias, lymphadenopathy, easy bleeding, arrhythmias, and allergic reaction.
Azithromycin Pregnancy And Lactation Text: This medication is considered safe during pregnancy and is also secreted in breast milk.
Cimetidine Counseling:  I discussed with the patient the risks of Cimetidine including but not limited to gynecomastia, headache, diarrhea, nausea, drowsiness, arrhythmias, pancreatitis, skin rashes, psychosis, bone marrow suppression and kidney toxicity.
Acitretin Counseling:  I discussed with the patient the risks of acitretin including but not limited to hair loss, dry lips/skin/eyes, liver damage, hyperlipidemia, depression/suicidal ideation, photosensitivity.  Serious rare side effects can include but are not limited to pancreatitis, pseudotumor cerebri, bony changes, clot formation/stroke/heart attack.  Patient understands that alcohol is contraindicated since it can result in liver toxicity and significantly prolong the elimination of the drug by many years.
Hydroxychloroquine Counseling:  I discussed with the patient that a baseline ophthalmologic exam is needed at the start of therapy and every year thereafter while on therapy. A CBC may also be warranted for monitoring.  The side effects of this medication were discussed with the patient, including but not limited to agranulocytosis, aplastic anemia, seizures, rashes, retinopathy, and liver toxicity. Patient instructed to call the office should any adverse effect occur.  The patient verbalized understanding of the proper use and possible adverse effects of Plaquenil.  All the patient's questions and concerns were addressed.
Olanzapine Pregnancy And Lactation Text: This medication is pregnancy category C.   There are no adequate and well controlled trials with olanzapine in pregnant females.  Olanzapine should be used during pregnancy only if the potential benefit justifies the potential risk to the fetus.   In a study in lactating healthy women, olanzapine was excreted in breast milk.  It is recommended that women taking olanzapine should not breast feed.
Topical Steroids Counseling: I discussed with the patient that prolonged use of topical steroids can result in the increased appearance of superficial blood vessels (telangiectasias), lightening (hypopigmentation) and thinning of the skin (atrophy).  Patient understands to avoid using high potency steroids in skin folds, the groin or the face.  The patient verbalized understanding of the proper use and possible adverse effects of topical steroids.  All of the patient's questions and concerns were addressed.
Qbrexza Counseling:  I discussed with the patient the risks of Qbrexza including but not limited to headache, mydriasis, blurred vision, dry eyes, nasal dryness, dry mouth, dry throat, dry skin, urinary hesitation, and constipation.  Local skin reactions including erythema, burning, stinging, and itching can also occur.
Xolair Counseling:  Patient informed of potential adverse effects including but not limited to fever, muscle aches, rash and allergic reactions.  The patient verbalized understanding of the proper use and possible adverse effects of Xolair.  All of the patient's questions and concerns were addressed.
Aklief Pregnancy And Lactation Text: It is unknown if this medication is safe to use during pregnancy.  It is unknown if this medication is excreted in breast milk.  Breastfeeding women should use the topical cream on the smallest area of the skin for the shortest time needed while breastfeeding.  Do not apply to nipple and areola.
Griseofulvin Counseling:  I discussed with the patient the risks of griseofulvin including but not limited to photosensitivity, cytopenia, liver damage, nausea/vomiting and severe allergy.  The patient understands that this medication is best absorbed when taken with a fatty meal (e.g., ice cream or french fries).
Olumiant Counseling: I discussed with the patient the risks of Olumiant therapy including but not limited to upper respiratory tract infections, shingles, cold sores, and nausea. Live vaccines should be avoided.  This medication has been linked to serious infections; higher rate of mortality; malignancy and lymphoproliferative disorders; major adverse cardiovascular events; thrombosis; gastrointestinal perforations; neutropenia; lymphopenia; anemia; liver enzyme elevations; and lipid elevations.
Dutasteride Male Counseling: Dustasteride Counseling:  I discussed with the patient the risks of use of dutasteride including but not limited to decreased libido, decreased ejaculate volume, and gynecomastia. Women who can become pregnant should not handle medication.  All of the patient's questions and concerns were addressed.
Calcipotriene Counseling:  I discussed with the patient the risks of calcipotriene including but not limited to erythema, scaling, itching, and irritation.
Spironolactone Pregnancy And Lactation Text: This medication can cause feminization of the male fetus and should be avoided during pregnancy. The active metabolite is also found in breast milk.
Eucrisa Counseling: Patient may experience a mild burning sensation during topical application. Eucrisa is not approved in children less than 3 months of age.
Bactrim Counseling:  I discussed with the patient the risks of sulfa antibiotics including but not limited to GI upset, allergic reaction, drug rash, diarrhea, dizziness, photosensitivity, and yeast infections.  Rarely, more serious reactions can occur including but not limited to aplastic anemia, agranulocytosis, methemoglobinemia, blood dyscrasias, liver or kidney failure, lung infiltrates or desquamative/blistering drug rashes.
Sski Pregnancy And Lactation Text: This medication is Pregnancy Category D and isn't considered safe during pregnancy. It is excreted in breast milk.

## 2024-08-15 NOTE — PROCEDURE: COUNSELING
Patient Specific Counseling (Will Not Stick From Patient To Patient): —\\nBG notes skin looking much better.
Detail Level: Zone